# Patient Record
Sex: FEMALE | Race: BLACK OR AFRICAN AMERICAN | Employment: FULL TIME | URBAN - METROPOLITAN AREA
[De-identification: names, ages, dates, MRNs, and addresses within clinical notes are randomized per-mention and may not be internally consistent; named-entity substitution may affect disease eponyms.]

---

## 2017-04-06 ENCOUNTER — OFFICE VISIT (OUTPATIENT)
Dept: FAMILY MEDICINE CLINIC | Age: 27
End: 2017-04-06

## 2017-04-06 VITALS
HEART RATE: 115 BPM | SYSTOLIC BLOOD PRESSURE: 124 MMHG | RESPIRATION RATE: 16 BRPM | OXYGEN SATURATION: 98 % | HEIGHT: 64 IN | DIASTOLIC BLOOD PRESSURE: 80 MMHG | BODY MASS INDEX: 23.2 KG/M2 | WEIGHT: 135.9 LBS | TEMPERATURE: 100.2 F

## 2017-04-06 DIAGNOSIS — R01.1 MURMUR: ICD-10-CM

## 2017-04-06 DIAGNOSIS — J02.9 SORE THROAT: ICD-10-CM

## 2017-04-06 DIAGNOSIS — R50.9 FEVER, UNSPECIFIED FEVER CAUSE: Primary | ICD-10-CM

## 2017-04-06 DIAGNOSIS — T63.481A LOCAL REACTION TO INSECT STING, ACCIDENTAL OR UNINTENTIONAL, INITIAL ENCOUNTER: ICD-10-CM

## 2017-04-06 DIAGNOSIS — B34.9 VIRAL SYNDROME: ICD-10-CM

## 2017-04-06 LAB
QUICKVUE INFLUENZA TEST: NEGATIVE
S PYO AG THROAT QL: NEGATIVE
VALID INTERNAL CONTROL?: YES
VALID INTERNAL CONTROL?: YES

## 2017-04-06 RX ORDER — HYDROXYZINE PAMOATE 25 MG/1
25-50 CAPSULE ORAL
Qty: 30 CAP | Refills: 0 | Status: SHIPPED | OUTPATIENT
Start: 2017-04-06 | End: 2017-04-20

## 2017-04-06 RX ORDER — TRIAMCINOLONE ACETONIDE 5 MG/G
CREAM TOPICAL
Qty: 15 G | Refills: 5 | Status: SHIPPED | OUTPATIENT
Start: 2017-04-06

## 2017-04-06 RX ORDER — BISMUTH SUBSALICYLATE 262 MG
1 TABLET,CHEWABLE ORAL DAILY
COMMUNITY
End: 2017-10-09 | Stop reason: ALTCHOICE

## 2017-04-06 NOTE — PATIENT INSTRUCTIONS
TODAY, please go to:   CHECK OUT     Please schedule the following appointments at CHECK OUT:  · Dr. Goodwin Dear as needed    _____________________     XTUIL'K Plan:  · Use triamcinolone on areas of swelling  · You can also add hydroxyzine to help with itch. Do not take if driving or if you need to be alert because this medicine can make your sleepy. For viral infection symptoms:   Use OTC Tylenol (up to 650mg every 6 hours)  CAN ALTERNATE with Ibuprofen (up to 800 mg every 8 hours) as needed for pain, fever or headaches. · Use a nasal steroid (flonase, nasonex, nasocort, OTC)  · Increase fluid intake, especially water to thin mucous and boost the immune system. · Avoid sugar and dairy while congested since they thicken mucous. · Get plenty of rest!    · Gargle 3 times daily and as needed in Listerine or warm salt water vinegar solutions (1 tsp salt, 1 tsp vinegar in 1 cup lukewarm water.)    · Use OTC nasal saline spray up each nostril four times daily. You could also consider using a netipot with distilled water. · Use humidifier at bedtime. · Use OTC Mucinex 600 mg twice daily to loosen mucous. ·  Avoid decongestants and Ibuprofen if you have high blood pressure! Return to the doctor for evaluation:  · If mucous is consistently discolored yellow or green throughout the day for more than a week  · If you develop worsening facial pain  · If you develop a fever that will not go away  · If your symptoms worsen instead of improve        _____________________     Review your health maintenance below. Make plans to return and address anything that is due or will be due soon. There are no preventive care reminders to display for this patient.

## 2017-04-06 NOTE — PROGRESS NOTES
[address] Visit   Patient ID:   Idania Hill is a 32 y.o. female. Assessment/Plan:    Aaron Cook was seen today for sore throat, ear fullness, insect bite, headache, chills and ear pain. Diagnoses and all orders for this visit:      Viral syndrome  Sx likely viral etiology. No e/o bacterial infection on exam/ reviewed return precautions. Recommend tylenol and or ibuprofen for discomfort and fever. Reviewed how to alternate medications. See pt instructions for more. Fever, unspecified fever cause  flu and strep neg. Murmur today likely related to fever. Monitor on follow up   -     AMB POC RAPID STREP A  -     AMB POC RAPID INFLUENZA TEST    Sore throat  Strep negative  -     AMB POC RAPID STREP A    Local reaction to insect sting, accidental or unintentional, initial encounter  No cellulitis. Favor local allergic reaction. tx with topical steroid bid. Reviewed return precautions. -     hydrOXYzine pamoate (VISTARIL) 25 mg capsule; Take 1-2 Caps by mouth two (2) times daily as needed for Itching for up to 14 days. -     triamcinolone (ARISTOCORT) 0.5 % topical cream; Apply  to affected area two (2) times daily as needed for Other (bug bite with swelling). use thin layer      Counselled pt on:  Patient health concerns, plan as outlined in patient instructions and above. Patient was offered a choice/choices in the treatment plan today. Patient expresses understanding of the plan and agrees with recommendations. Patient Instructions   TODAY, please go to:   CHECK OUT     Please schedule the following appointments at CHECK OUT:  · Dr. Paloma Medina as needed    _____________________     RHKBT'X Plan:  · Use triamcinolone on areas of swelling  · You can also add hydroxyzine to help with itch. Do not take if driving or if you need to be alert because this medicine can make your sleepy.      For viral infection symptoms:   Use OTC Tylenol (up to 650mg every 6 hours)  CAN ALTERNATE with Ibuprofen (up to 800 mg every 8 hours) as needed for pain, fever or headaches. · Use a nasal steroid (flonase, nasonex, nasocort, OTC)  · Increase fluid intake, especially water to thin mucous and boost the immune system. · Avoid sugar and dairy while congested since they thicken mucous. · Get plenty of rest!    · Gargle 3 times daily and as needed in Listerine or warm salt water vinegar solutions (1 tsp salt, 1 tsp vinegar in 1 cup lukewarm water.)    · Use OTC nasal saline spray up each nostril four times daily. You could also consider using a netipot with distilled water. · Use humidifier at bedtime. · Use OTC Mucinex 600 mg twice daily to loosen mucous. ·  Avoid decongestants and Ibuprofen if you have high blood pressure! Return to the doctor for evaluation:  · If mucous is consistently discolored yellow or green throughout the day for more than a week  · If you develop worsening facial pain  · If you develop a fever that will not go away  · If your symptoms worsen instead of improve        _____________________     Review your health maintenance below. Make plans to return and address anything that is due or will be due soon. There are no preventive care reminders to display for this patient. Subjective:   HPI:  Aubrie Mosley is a 32 y.o. female being seen for:   Chief Complaint   Patient presents with    Sore Throat     before the bug bite    Ear Fullness     before the bug bite,     Insect Bite     bites on both thighs and right calf    Headache     before the bug bite    Chills    Ear Pain     Past medical history, surgical history, social history, family history, medications, allergies reviewed and updated.  See below for more detail  pcp Jerrel Cockayne, DO     ST  ·  headache since 4/3   · Other symptoms about 2 days  · Throat hurts into ears  · Dry cough  · Rhinorrhea  · Fever at home, Tx 100.3  · Feeling bad and had cold sx before going to meeting and having skin lesions    Swelling, bug bites  · Got possible insect bites on 4/4. Was at a meeting inside. Wore a dress that day. Cloth seats. 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 Objective:     Visit Vitals    /80 (BP 1 Location: Left arm, BP Patient Position: Sitting)    Pulse (!) 115    Temp 100.2 °F (37.9 °C) (Oral)    Resp 16    Ht 5' 4.02\" (1.626 m)    Wt 135 lb 14.4 oz (61.6 kg)    SpO2 98%    BMI 23.32 kg/m2     Wt Readings from Last 3 Encounters:   04/06/17 135 lb 14.4 oz (61.6 kg)   11/02/16 136 lb (61.7 kg)   01/19/14 131 lb 13.4 oz (59.8 kg)     PHQ 2 / 9, over the last two weeks 4/6/2017   Little interest or pleasure in doing things Not at all   Feeling down, depressed or hopeless Not at all   Total Score PHQ 2 0         Physical Exam   Constitutional: She appears well-developed and well-nourished. Non-toxic appearance. No distress. HENT:   Right Ear: Tympanic membrane and ear canal normal. Tympanic membrane is not erythematous and not bulging. Left Ear: Tympanic membrane and ear canal normal. Tympanic membrane is not erythematous and not bulging.    Nose: Mucosal edema and rhinorrhea present. Right sinus exhibits no maxillary sinus tenderness and no frontal sinus tenderness. Left sinus exhibits no maxillary sinus tenderness and no frontal sinus tenderness. Mouth/Throat: Posterior oropharyngeal erythema present. No oropharyngeal exudate, posterior oropharyngeal edema or tonsillar abscesses. Tonsils are 1+ on the right. Tonsils are 1+ on the left. No tonsillar exudate. ?serous fluid behind b/l TM   Cardiovascular: Normal rate and regular rhythm. Exam reveals no gallop and no friction rub. Murmur heard. Systolic murmur is present with a grade of 3/6   Pulmonary/Chest: Effort normal. No respiratory distress. She has no decreased breath sounds. She has no wheezes. She has no rhonchi. She has no rales. Neurological: She is alert. Skin:        Large areas with central erythema and mild induration and warmth that fades approaching the borders. No fluctiance or drainage. No punctum. Psychiatric: She has a normal mood and affect. Her behavior is normal.     Allergies   Allergen Reactions    Pcn [Penicillins] Hives    Venom-Honey Bee Hives     AND WASPS     Prior to Admission medications    Medication Sig Start Date End Date Taking? Authorizing Provider   multivitamin (ONE A DAY) tablet Take 1 Tab by mouth daily. Yes Historical Provider   hydrOXYzine pamoate (VISTARIL) 25 mg capsule Take 1-2 Caps by mouth two (2) times daily as needed for Itching for up to 14 days. 4/6/17 4/20/17 Yes Jose F Maki MD   triamcinolone (ARISTOCORT) 0.5 % topical cream Apply  to affected area two (2) times daily as needed for Other (bug bite with swelling). use thin layer 4/6/17  Yes Lui Mo. Celestine Maki MD   L-Norgest&E Estradiol-E Estrad (SEASONIQUE) 0.15 mg-30 mcg (84)/10 mcg (7) 3MPk Take 1 Tab by mouth daily.  Indications: PREGNANCY CONTRACEPTION 11/2/16  Yes Chrissy Riggs,    EPINEPHrine (EPIPEN) 0.3 mg/0.3 mL (1:1,000) injection 0.3 mL by IntraMUSCular route once as needed for Allergic Response (BEE STINGS AND WASPS) for 1 dose.  1/10/13  Yes Graham Sierra, DO

## 2017-04-06 NOTE — PROGRESS NOTES
Chief Complaint   Patient presents with    Sore Throat     before the bug bite    Ear Fullness     before the bug bite    Insect Bite     bites on both thighs and right calf    Headache     before the bug bite           1. Have you been to the ER, urgent care clinic since your last visit? Hospitalized since your last visit? no    2. Have you seen or consulted any other health care providers outside of the [address] [place] [address] since your last visit? Include any pap smears or colon screening.   NO

## 2017-04-06 NOTE — LETTER
NOTIFICATION RETURN TO WORK / SCHOOL 
 
4/6/2017 11:45 AM 
 
Ms. Trice Paredes [address] [facility] To Whom It May Concern: 
 
Trice Paredes is currently under the care of Lele Martínez. She will return to work/school on: 4/12/17 If there are questions or concerns please have the patient contact our office. Sincerely, 
 
 
Edgar Alvarado MD

## 2017-10-09 ENCOUNTER — OFFICE VISIT (OUTPATIENT)
Dept: FAMILY MEDICINE CLINIC | Age: 27
End: 2017-10-09

## 2017-10-09 VITALS
WEIGHT: 131.5 LBS | HEART RATE: 86 BPM | BODY MASS INDEX: 22.45 KG/M2 | SYSTOLIC BLOOD PRESSURE: 125 MMHG | DIASTOLIC BLOOD PRESSURE: 86 MMHG | RESPIRATION RATE: 16 BRPM | TEMPERATURE: 97.5 F | HEIGHT: 64 IN | OXYGEN SATURATION: 98 %

## 2017-10-09 DIAGNOSIS — N93.8 DUB (DYSFUNCTIONAL UTERINE BLEEDING): ICD-10-CM

## 2017-10-09 DIAGNOSIS — Z23 ENCOUNTER FOR IMMUNIZATION: ICD-10-CM

## 2017-10-09 DIAGNOSIS — R63.4 WEIGHT LOSS: ICD-10-CM

## 2017-10-09 DIAGNOSIS — Z80.3 FAMILY HISTORY OF BREAST CANCER: ICD-10-CM

## 2017-10-09 DIAGNOSIS — Z91.030 BEE STING ALLERGY: ICD-10-CM

## 2017-10-09 DIAGNOSIS — Z00.00 WELL WOMAN EXAM (NO GYNECOLOGICAL EXAM): Primary | ICD-10-CM

## 2017-10-09 DIAGNOSIS — Z82.49 FAMILY HISTORY OF HEART DISEASE: ICD-10-CM

## 2017-10-09 DIAGNOSIS — Z83.3 FAMILY HISTORY OF DIABETES MELLITUS (DM): ICD-10-CM

## 2017-10-09 DIAGNOSIS — R03.0 ELEVATED BLOOD PRESSURE READING WITHOUT DIAGNOSIS OF HYPERTENSION: ICD-10-CM

## 2017-10-09 DIAGNOSIS — E55.9 VITAMIN D DEFICIENCY: ICD-10-CM

## 2017-10-09 PROBLEM — T63.481A LOCAL REACTION TO INSECT STING: Status: RESOLVED | Noted: 2017-04-06 | Resolved: 2017-10-09

## 2017-10-09 LAB
HCG URINE, QL. (POC): NEGATIVE
VALID INTERNAL CONTROL?: YES

## 2017-10-09 RX ORDER — LEVONORGESTREL / ETHINYL ESTRADIOL AND ETHINYL ESTRADIOL 150-30(84)
1 KIT ORAL DAILY
Qty: 3 PACKAGE | Refills: 3 | Status: SHIPPED | OUTPATIENT
Start: 2017-10-09

## 2017-10-09 RX ORDER — EPINEPHRINE 0.3 MG/.3ML
0.3 INJECTION SUBCUTANEOUS
Qty: 0.3 ML | Refills: 1 | Status: SHIPPED | OUTPATIENT
Start: 2017-10-09

## 2017-10-09 NOTE — PROGRESS NOTES
Jo Ann Ruiz is a 32 y.o. female who presents for routine immunizations. She denies any symptoms , reactions or allergies that would exclude them from being immunized today. Risks and adverse reactions were discussed and the VIS was given to them. All questions were addressed. She was observed for 15 min post injection. There were no reactions observed.     Denisse Tamez

## 2017-10-09 NOTE — PATIENT INSTRUCTIONS
Well Visit, Ages 25 to 48: Care Instructions  Your Care Instructions  Physical exams can help you stay healthy. Your doctor has checked your overall health and may have suggested ways to take good care of yourself. He or she also may have recommended tests. At home, you can help prevent illness with healthy eating, regular exercise, and other steps. Follow-up care is a key part of your treatment and safety. Be sure to make and go to all appointments, and call your doctor if you are having problems. It's also a good idea to know your test results and keep a list of the medicines you take. How can you care for yourself at home? · Reach and stay at a healthy weight. This will lower your risk for many problems, such as obesity, diabetes, heart disease, and high blood pressure. · Get at least 30 minutes of physical activity on most days of the week. Walking is a good choice. You also may want to do other activities, such as running, swimming, cycling, or playing tennis or team sports. Discuss any changes in your exercise program with your doctor. · Do not smoke or allow others to smoke around you. If you need help quitting, talk to your doctor about stop-smoking programs and medicines. These can increase your chances of quitting for good. · Talk to your doctor about whether you have any risk factors for sexually transmitted infections (STIs). Having one sex partner (who does not have STIs and does not have sex with anyone else) is a good way to avoid these infections. · Use birth control if you do not want to have children at this time. Talk with your doctor about the choices available and what might be best for you. · Protect your skin from too much sun. When you're outdoors from 10 a.m. to 4 p.m., stay in the shade or cover up with clothing and a hat with a wide brim. Wear sunglasses that block UV rays. Even when it's cloudy, put broad-spectrum sunscreen (SPF 30 or higher) on any exposed skin.   · See a dentist one or two times a year for checkups and to have your teeth cleaned. · Wear a seat belt in the car. · Drink alcohol in moderation, if at all. That means no more than 2 drinks a day for men and 1 drink a day for women. Follow your doctor's advice about when to have certain tests. These tests can spot problems early. For everyone  · Cholesterol. Have the fat (cholesterol) in your blood tested after age 21. Your doctor will tell you how often to have this done based on your age, family history, or other things that can increase your risk for heart disease. · Blood pressure. Have your blood pressure checked during a routine doctor visit. Your doctor will tell you how often to check your blood pressure based on your age, your blood pressure results, and other factors. · Vision. Talk with your doctor about how often to have a glaucoma test.  · Diabetes. Ask your doctor whether you should have tests for diabetes. · Colon cancer. Have a test for colon cancer at age 48. You may have one of several tests. If you are younger than 48, you may need a test earlier if you have any risk factors. Risk factors include whether you already had a precancerous polyp removed from your colon or whether your parent, brother, sister, or child has had colon cancer. For women  · Breast exam and mammogram. Talk to your doctor about when you should have a clinical breast exam and a mammogram. Medical experts differ on whether and how often women under 50 should have these tests. Your doctor can help you decide what is right for you. · Pap test and pelvic exam. Begin Pap tests at age 24. A Pap test is the best way to find cervical cancer. The test often is part of a pelvic exam. Ask how often to have this test.  · Tests for sexually transmitted infections (STIs). Ask whether you should have tests for STIs. You may be at risk if you have sex with more than one person, especially if your partners do not wear condoms.   For men  · Tests for sexually transmitted infections (STIs). Ask whether you should have tests for STIs. You may be at risk if you have sex with more than one person, especially if you do not wear a condom. · Testicular cancer exam. Ask your doctor whether you should check your testicles regularly. · Prostate exam. Talk to your doctor about whether you should have a blood test (called a PSA test) for prostate cancer. Experts differ on whether and when men should have this test. Some experts suggest it if you are older than 39 and are -American or have a father or brother who got prostate cancer when he was younger than 72. When should you call for help? Watch closely for changes in your health, and be sure to contact your doctor if you have any problems or symptoms that concern you. Where can you learn more? Go to http://deisy-annabel.info/. Enter P072 in the search box to learn more about \"Well Visit, Ages 25 to 48: Care Instructions. \"  Current as of: July 19, 2016  Content Version: 11.3  © 5555-3361 [employer]. Care instructions adapted under license by [employer] (which disclaims liability or warranty for this information). If you have questions about a medical condition or this instruction, always ask your healthcare professional. [place] [address] any warranty or liability for your use of this information. Elevated Blood Pressure: Care Instructions  Your Care Instructions    Blood pressure is a measure of how hard the blood pushes against the walls of your arteries. It's normal for blood pressure to go up and down throughout the day. But if it stays up over time, you have high blood pressure. Two numbers tell you your blood pressure. The first number is the systolic pressure. It shows how hard the blood pushes when your heart is pumping. The second number is the diastolic pressure.  It shows how hard the blood pushes between heartbeats, when your heart is relaxed and filling with blood. An ideal blood pressure in adults is less than 120/80 (say \"120 over 80\"). High blood pressure is 140/90 or higher. You have high blood pressure if your top number is 140 or higher or your bottom number is 90 or higher, or both. The main test for high blood pressure is simple, fast, and painless. To diagnose high blood pressure, your doctor will test your blood pressure at different times. After testing your blood pressure, your doctor may ask you to test it again when you are home. If you are diagnosed with high blood pressure, you can work with your doctor to make a long-term plan to manage it. Follow-up care is a key part of your treatment and safety. Be sure to make and go to all appointments, and call your doctor if you are having problems. It's also a good idea to know your test results and keep a list of the medicines you take. How can you care for yourself at home? · Do not smoke. Smoking increases your risk for heart attack and stroke. If you need help quitting, talk to your doctor about stop-smoking programs and medicines. These can increase your chances of quitting for good. · Stay at a healthy weight. · Try to limit how much sodium you eat to less than 2,300 milligrams (mg) a day. Your doctor may ask you to try to eat less than 1,500 mg a day. · Be physically active. Get at least 30 minutes of exercise on most days of the week. Walking is a good choice. You also may want to do other activities, such as running, swimming, cycling, or playing tennis or team sports. · Avoid or limit alcohol. Talk to your doctor about whether you can drink any alcohol. · Eat plenty of fruits, vegetables, and low-fat dairy products. Eat less saturated and total fats. · Learn how to check your blood pressure at home. When should you call for help?   Call your doctor now or seek immediate medical care if:  · Your blood pressure is much higher than normal (such as 180/110 or higher). · You think high blood pressure is causing symptoms such as:  ¨ Severe headache. ¨ Blurry vision. Watch closely for changes in your health, and be sure to contact your doctor if:  · You do not get better as expected. Where can you learn more? Go to http://deisy-annabel.info/. Enter L845 in the search box to learn more about \"Elevated Blood Pressure: Care Instructions. \"  Current as of: April 3, 2017  Content Version: 11.3  © 9555-9851 [employer]. Care instructions adapted under license by [employer] (which disclaims liability or warranty for this information). If you have questions about a medical condition or this instruction, always ask your healthcare professional. [address] any warranty or liability for your use of this information. Check BP twice weekly. Notify me if it consistently is above 140/90 or below 90/60. Bring your blood pressure log to your next office visit. Decrease salt, fats, caffeine, alcohol in your diet. Increase water, fiber. Exercise 5 times weekly for 30 minutes daily as tolerated. Continue current medications, care and subspecialty follow up. Visit eye doctor yearly to check your eyes blood pressure related changes. Elevated Blood Pressure: Care Instructions  Your Care Instructions    Blood pressure is a measure of how hard the blood pushes against the walls of your arteries. It's normal for blood pressure to go up and down throughout the day. But if it stays up over time, you have high blood pressure. Two numbers tell you your blood pressure. The first number is the systolic pressure. It shows how hard the blood pushes when your heart is pumping. The second number is the diastolic pressure. It shows how hard the blood pushes between heartbeats, when your heart is relaxed and filling with blood.  An ideal blood pressure in adults is less than 120/80 (say \"120 over 80\"). High blood pressure is 140/90 or higher. You have high blood pressure if your top number is 140 or higher or your bottom number is 90 or higher, or both. The main test for high blood pressure is simple, fast, and painless. To diagnose high blood pressure, your doctor will test your blood pressure at different times. After testing your blood pressure, your doctor may ask you to test it again when you are home. If you are diagnosed with high blood pressure, you can work with your doctor to make a long-term plan to manage it. Follow-up care is a key part of your treatment and safety. Be sure to make and go to all appointments, and call your doctor if you are having problems. It's also a good idea to know your test results and keep a list of the medicines you take. How can you care for yourself at home? · Do not smoke. Smoking increases your risk for heart attack and stroke. If you need help quitting, talk to your doctor about stop-smoking programs and medicines. These can increase your chances of quitting for good. · Stay at a healthy weight. · Try to limit how much sodium you eat to less than 2,300 milligrams (mg) a day. Your doctor may ask you to try to eat less than 1,500 mg a day. · Be physically active. Get at least 30 minutes of exercise on most days of the week. Walking is a good choice. You also may want to do other activities, such as running, swimming, cycling, or playing tennis or team sports. · Avoid or limit alcohol. Talk to your doctor about whether you can drink any alcohol. · Eat plenty of fruits, vegetables, and low-fat dairy products. Eat less saturated and total fats. · Learn how to check your blood pressure at home. When should you call for help? Call your doctor now or seek immediate medical care if:  · Your blood pressure is much higher than normal (such as 180/110 or higher). · You think high blood pressure is causing symptoms such as:  ¨ Severe headache.   ¨ Blurry vision. Watch closely for changes in your health, and be sure to contact your doctor if:  · You do not get better as expected. Where can you learn more? Go to http://deisy-annabel.info/. Enter M114 in the search box to learn more about \"Elevated Blood Pressure: Care Instructions. \"  Current as of: April 3, 2017  Content Version: 11.3  © 3277-9228 [employer]. Care instructions adapted under license by [employer] (which disclaims liability or warranty for this information). If you have questions about a medical condition or this instruction, always ask your healthcare professional. [place] [address] any warranty or liability for your use of this information.

## 2017-10-09 NOTE — MR AVS SNAPSHOT
Visit Information Date & Time Provider Department Dept. Phone Encounter #  
 10/9/2017  2:00 PM Alyx Phillips DO [facility] [phone] [id number] Follow-up Instructions Return in about 1 year (around 10/9/2018) for cpe. Upcoming Health Maintenance Date Due  
 PAP AKA CERVICAL CYTOLOGY 7/22/2019 DTaP/Tdap/Td series (2 - Td) 5/1/2022 Allergies as of 10/9/2017  Review Complete On: 10/9/2017 By: Alyx Phillips DO Severity Noted Reaction Type Reactions Pcn [Penicillins] High 12/22/2012    Hives Venom-honey Bee High 01/10/2013    Hives AND WASPS Current Immunizations  Reviewed on 10/9/2017 Name Date HPV 12/22/2008, 8/11/2008, 6/9/2008 Influenza Vaccine 10/28/2012 Influenza Vaccine (Quad) PF 11/2/2016  4:12 PM  
 Meningococcal (MCV4) Vaccine 1/1/1994 Meningococcal (MCV4P) Vaccine 12/6/2013 Td 1/1/1994 Tdap 5/1/2012 Reviewed by Alyx Phillips DO on 10/9/2017 at  3:26 PM  
You Were Diagnosed With   
  
 Codes Comments Well woman exam (no gynecological exam)    -  Primary ICD-10-CM: Z00.00 ICD-9-CM: V70.0   
 DUB (dysfunctional uterine bleeding)     ICD-10-CM: N93.8 ICD-9-CM: 626.8 stable on BCP Elevated blood pressure reading without diagnosis of hypertension     ICD-10-CM: R03.0 ICD-9-CM: 796.2 improving with exercise Vitamin D deficiency     ICD-10-CM: E55.9 ICD-9-CM: 268.9 Bee sting allergy     ICD-10-CM: Z91.038 
ICD-9-CM: V15.06 Weight loss     ICD-10-CM: R63.4 ICD-9-CM: 783.21 Family history of heart disease     ICD-10-CM: Z82.49 
ICD-9-CM: V17.49 Family history of diabetes mellitus (DM)     ICD-10-CM: Z83.3 ICD-9-CM: V18.0 Family history of breast cancer     ICD-10-CM: Z80.3 ICD-9-CM: V16.3 Vitals BP Pulse Temp Resp Height(growth percentile) Weight(growth percentile)  125/86 (BP 1 Location: Left arm, BP Patient Position: Sitting) 86 97.5 °F (36.4 °C) (Oral) 16 5' 4.02\" (1.626 m) 131 lb 8 oz (59.6 kg) LMP SpO2 BMI OB Status Smoking Status 2017 98% 22.56 kg/m2 Medically Induced Passive Smoke Exposure - Never Smoker Vitals History BMI and BSA Data Body Mass Index Body Surface Area  
 22.56 kg/m 2 1.64 m 2 Preferred Pharmacy Pharmacy Name Phone [facility] [id number] [facility] - [place] [address], [address] [facility] [phone] Your Updated Medication List  
  
   
This list is accurate as of: 10/9/17  3:33 PM.  Always use your most recent med list.  
  
  
  
  
 EPINEPHrine 0.3 mg/0.3 mL injection Commonly known as:  EPIPEN  
0.3 mL by IntraMUSCular route once as needed for Allergic Response (BEE STINGS AND WASPS) for up to 1 dose. L-Norgest&E Estradiol-E Estrad 0.15 mg-30 mcg (84)/10 mcg (7) 3mpk Commonly known as:  Rhetta Yuniel Take 1 Tab by mouth daily. Indications: Pregnancy Contraception  
  
 triamcinolone 0.5 % topical cream  
Commonly known as:  ARISTOCORT Apply  to affected area two (2) times daily as needed for Other (bug bite with swelling). use thin layer Prescriptions Printed Refills EPINEPHrine (EPIPEN) 0.3 mg/0.3 mL injection 1 Si.3 mL by IntraMUSCular route once as needed for Allergic Response (BEE STINGS AND WASPS) for up to 1 dose. Class: Print Route: IntraMUSCular Prescriptions Sent to Pharmacy Refills L-Norgest&E Estradiol-E Estrad (SEASONIQUE) 0.15 mg-30 mcg (84)/10 mcg (7) 3MPk 3 Sig: Take 1 Tab by mouth daily. Indications: Pregnancy Contraception Class: Normal  
 Pharmacy: [address] Ph #: [phone] Route: Oral  
  
We Performed the Following AMB POC URINE PREGNANCY TEST, VISUAL COLOR COMPARISON [35051 CPT(R)] CBC W/O DIFF [85070 CPT(R)] LIPID PANEL [38242 CPT(R)] METABOLIC PANEL, COMPREHENSIVE [19466 CPT(R)] TSH 3RD GENERATION [37929 CPT(R)] VITAMIN D, 25 HYDROXY [id number] CPT(R)] Follow-up Instructions Return in about 1 year (around 10/9/2018) for cpe. Patient Instructions Well Visit, Ages 25 to 48: Care Instructions Your Care Instructions Physical exams can help you stay healthy. Your doctor has checked your overall health and may have suggested ways to take good care of yourself. He or she also may have recommended tests. At home, you can help prevent illness with healthy eating, regular exercise, and other steps. Follow-up care is a key part of your treatment and safety. Be sure to make and go to all appointments, and call your doctor if you are having problems. It's also a good idea to know your test results and keep a list of the medicines you take. How can you care for yourself at home? · Reach and stay at a healthy weight. This will lower your risk for many problems, such as obesity, diabetes, heart disease, and high blood pressure. · Get at least 30 minutes of physical activity on most days of the week. Walking is a good choice. You also may want to do other activities, such as running, swimming, cycling, or playing tennis or team sports. Discuss any changes in your exercise program with your doctor. · Do not smoke or allow others to smoke around you. If you need help quitting, talk to your doctor about stop-smoking programs and medicines. These can increase your chances of quitting for good. · Talk to your doctor about whether you have any risk factors for sexually transmitted infections (STIs). Having one sex partner (who does not have STIs and does not have sex with anyone else) is a good way to avoid these infections. · Use birth control if you do not want to have children at this time. Talk with your doctor about the choices available and what might be best for you. · Protect your skin from too much sun. When you're outdoors from 10 a.m. to 4 p.m., stay in the shade or cover up with clothing and a hat with a wide brim. Wear sunglasses that block UV rays. Even when it's cloudy, put broad-spectrum sunscreen (SPF 30 or higher) on any exposed skin. · See a dentist one or two times a year for checkups and to have your teeth cleaned. · Wear a seat belt in the car. · Drink alcohol in moderation, if at all. That means no more than 2 drinks a day for men and 1 drink a day for women. Follow your doctor's advice about when to have certain tests. These tests can spot problems early. For everyone · Cholesterol. Have the fat (cholesterol) in your blood tested after age 21. Your doctor will tell you how often to have this done based on your age, family history, or other things that can increase your risk for heart disease. · Blood pressure. Have your blood pressure checked during a routine doctor visit. Your doctor will tell you how often to check your blood pressure based on your age, your blood pressure results, and other factors. · Vision. Talk with your doctor about how often to have a glaucoma test. 
· Diabetes. Ask your doctor whether you should have tests for diabetes. · Colon cancer. Have a test for colon cancer at age 48. You may have one of several tests. If you are younger than 48, you may need a test earlier if you have any risk factors. Risk factors include whether you already had a precancerous polyp removed from your colon or whether your parent, brother, sister, or child has had colon cancer. For women · Breast exam and mammogram. Talk to your doctor about when you should have a clinical breast exam and a mammogram. Medical experts differ on whether and how often women under 50 should have these tests. Your doctor can help you decide what is right for you. · Pap test and pelvic exam. Begin Pap tests at age 24. A Pap test is the best way to find cervical cancer.  The test often is part of a pelvic exam. Ask how often to have this test. 
 · Tests for sexually transmitted infections (STIs). Ask whether you should have tests for STIs. You may be at risk if you have sex with more than one person, especially if your partners do not wear condoms. For men · Tests for sexually transmitted infections (STIs). Ask whether you should have tests for STIs. You may be at risk if you have sex with more than one person, especially if you do not wear a condom. · Testicular cancer exam. Ask your doctor whether you should check your testicles regularly. · Prostate exam. Talk to your doctor about whether you should have a blood test (called a PSA test) for prostate cancer. Experts differ on whether and when men should have this test. Some experts suggest it if you are older than 39 and are -American or have a father or brother who got prostate cancer when he was younger than 72. When should you call for help? Watch closely for changes in your health, and be sure to contact your doctor if you have any problems or symptoms that concern you. Where can you learn more? Go to http://deisy-annabel.info/. Enter P072 in the search box to learn more about \"Well Visit, Ages 25 to 48: Care Instructions. \" Current as of: July 19, 2016 Content Version: 11.3 © 2694-4317 [employer], [employer]. Care instructions adapted under license by backstitch (which disclaims liability or warranty for this information). If you have questions about a medical condition or this instruction, always ask your healthcare professional. [place] [address] any warranty or liability for your use of this information. Elevated Blood Pressure: Care Instructions Your Care Instructions Blood pressure is a measure of how hard the blood pushes against the walls of your arteries. It's normal for blood pressure to go up and down throughout the day. But if it stays up over time, you have high blood pressure. Two numbers tell you your blood pressure. The first number is the systolic pressure. It shows how hard the blood pushes when your heart is pumping. The second number is the diastolic pressure. It shows how hard the blood pushes between heartbeats, when your heart is relaxed and filling with blood. An ideal blood pressure in adults is less than 120/80 (say \"120 over 80\"). High blood pressure is 140/90 or higher. You have high blood pressure if your top number is 140 or higher or your bottom number is 90 or higher, or both. The main test for high blood pressure is simple, fast, and painless. To diagnose high blood pressure, your doctor will test your blood pressure at different times. After testing your blood pressure, your doctor may ask you to test it again when you are home. If you are diagnosed with high blood pressure, you can work with your doctor to make a long-term plan to manage it. Follow-up care is a key part of your treatment and safety. Be sure to make and go to all appointments, and call your doctor if you are having problems. It's also a good idea to know your test results and keep a list of the medicines you take. How can you care for yourself at home? · Do not smoke. Smoking increases your risk for heart attack and stroke. If you need help quitting, talk to your doctor about stop-smoking programs and medicines. These can increase your chances of quitting for good. · Stay at a healthy weight. · Try to limit how much sodium you eat to less than 2,300 milligrams (mg) a day. Your doctor may ask you to try to eat less than 1,500 mg a day. · Be physically active. Get at least 30 minutes of exercise on most days of the week. Walking is a good choice. You also may want to do other activities, such as running, swimming, cycling, or playing tennis or team sports. · Avoid or limit alcohol. Talk to your doctor about whether you can drink any alcohol. · Eat plenty of fruits, vegetables, and low-fat dairy products. Eat less saturated and total fats. · Learn how to check your blood pressure at home. When should you call for help? Call your doctor now or seek immediate medical care if: 
· Your blood pressure is much higher than normal (such as 180/110 or higher). · You think high blood pressure is causing symptoms such as: ¨ Severe headache. ¨ Blurry vision. Watch closely for changes in your health, and be sure to contact your doctor if: 
· You do not get better as expected. Where can you learn more? Go to http://deisy-annabel.info/. Enter P376 in the search box to learn more about \"Elevated Blood Pressure: Care Instructions. \" Current as of: April 3, 2017 Content Version: 11.3 © 0887-9191 [employer]XR. Care instructions adapted under license by LookIt (which disclaims liability or warranty for this information). If you have questions about a medical condition or this instruction, always ask your healthcare professional. [place] [address] any warranty or liability for your use of this information. Check BP twice weekly. Notify me if it consistently is above 140/90 or below 90/60. Bring your blood pressure log to your next office visit. Decrease salt, fats, caffeine, alcohol in your diet. Increase water, fiber. Exercise 5 times weekly for 30 minutes daily as tolerated. Continue current medications, care and subspecialty follow up. Visit eye doctor yearly to check your eyes blood pressure related changes. Elevated Blood Pressure: Care Instructions Your Care Instructions Blood pressure is a measure of how hard the blood pushes against the walls of your arteries. It's normal for blood pressure to go up and down throughout the day. But if it stays up over time, you have high blood pressure. Two numbers tell you your blood pressure.  The first number is the systolic pressure. It shows how hard the blood pushes when your heart is pumping. The second number is the diastolic pressure. It shows how hard the blood pushes between heartbeats, when your heart is relaxed and filling with blood. An ideal blood pressure in adults is less than 120/80 (say \"120 over 80\"). High blood pressure is 140/90 or higher. You have high blood pressure if your top number is 140 or higher or your bottom number is 90 or higher, or both. The main test for high blood pressure is simple, fast, and painless. To diagnose high blood pressure, your doctor will test your blood pressure at different times. After testing your blood pressure, your doctor may ask you to test it again when you are home. If you are diagnosed with high blood pressure, you can work with your doctor to make a long-term plan to manage it. Follow-up care is a key part of your treatment and safety. Be sure to make and go to all appointments, and call your doctor if you are having problems. It's also a good idea to know your test results and keep a list of the medicines you take. How can you care for yourself at home? · Do not smoke. Smoking increases your risk for heart attack and stroke. If you need help quitting, talk to your doctor about stop-smoking programs and medicines. These can increase your chances of quitting for good. · Stay at a healthy weight. · Try to limit how much sodium you eat to less than 2,300 milligrams (mg) a day. Your doctor may ask you to try to eat less than 1,500 mg a day. · Be physically active. Get at least 30 minutes of exercise on most days of the week. Walking is a good choice. You also may want to do other activities, such as running, swimming, cycling, or playing tennis or team sports. · Avoid or limit alcohol. Talk to your doctor about whether you can drink any alcohol. · Eat plenty of fruits, vegetables, and low-fat dairy products. Eat less saturated and total fats. · Learn how to check your blood pressure at home. When should you call for help? Call your doctor now or seek immediate medical care if: 
· Your blood pressure is much higher than normal (such as 180/110 or higher). · You think high blood pressure is causing symptoms such as: ¨ Severe headache. ¨ Blurry vision. Watch closely for changes in your health, and be sure to contact your doctor if: 
· You do not get better as expected. Where can you learn more? Go to http://deisy-annabel.info/. Enter U528 in the search box to learn more about \"Elevated Blood Pressure: Care Instructions. \" Current as of: April 3, 2017 Content Version: 11.3 © 8753-5259 [employer]. Care instructions adapted under license by GiftRocket (which disclaims liability or warranty for this information). If you have questions about a medical condition or this instruction, always ask your healthcare professional. [place] [address] any warranty or liability for your use of this information. Introducing [facility]! Dear Vasu Stockton: Thank you for requesting a Arcivr account. Our records indicate that you already have an active Arcivr account. You can access your account anytime at [email or web address]. [email or web address] Did you know that you can access your hospital and ER discharge instructions at any time in Arcivr? You can also review all of your test results from your hospital stay or ER visit. Additional Information If you have questions, please visit the Frequently Asked Questions section of the Arcivr website at [email or web address] Remember, Arcivr is NOT to be used for urgent needs. For medical emergencies, dial 911. Now available from your iPhone and Android! Please provide this summary of care documentation to your next provider. Your primary care clinician is listed as Gunnar Anna.  If you have any questions after today's visit, please call [phone].

## 2017-10-09 NOTE — PROGRESS NOTES
HISTORY OF PRESENT ILLNESS  Edis Niece is a 32 y.o. female here for an annual physical exam    Agree with nurse note. Pt with Vit D deficiency, hx of elevated BP, and family hx of breast cancer, heart disease, and DM presents to the office with a BP of 125/86. She weighs 131 lbs, lost 4 lbs since 04/2017. She started running and ran a 10k in 04/2017. She averages 10,000 steps daily and monitors via her FitBit. Pt with hx of DUB. She reports having pap smear in 2016 with Dr. Tony Rice. Requests refill of Seasonique, tolerating well. Menses have been stable. She had an eye exam in 01/2017 with [facility]. She has a hx of bee sting allergy and requests a new Epipen Rx. Written by lenard Faria, as dictated by Dr. Jan Cohn DO.    ROS    Review of Systems is negative except as mentioned above in HPI. ALLERGIES:    Allergies   Allergen Reactions    Pcn [Penicillins] Hives    Venom-Honey Bee Hives     AND WASPS       CURRENT MEDICATIONS:    Outpatient Prescriptions Marked as Taking for the 10/9/17 encounter (Office Visit) with Maxx Landry DO   Medication Sig Dispense Refill    L-Norgest&E Estradiol-E Estrad (SEASONIQUE) 0.15 mg-30 mcg (84)/10 mcg (7) 3MPk Take 1 Tab by mouth daily. Indications: Pregnancy Contraception 3 Package 3    EPINEPHrine (EPIPEN) 0.3 mg/0.3 mL injection 0.3 mL by IntraMUSCular route once as needed for Allergic Response (BEE STINGS AND WASPS) for up to 1 dose.  0.3 mL 1       PAST MEDICAL HISTORY:    Past Medical History:   Diagnosis Date    Asthma childhood    Chickenpox childhood    CTS (carpal tunnel syndrome)     DUB (dysfunctional uterine bleeding) 2008    Fibrocystic breast 12/2011       PAST SURGICAL HISTORY:    Past Surgical History:   Procedure Laterality Date    ME DEBRIDEMENT OPEN WOUND 20 SQ CM<  2015    [facility] [address]    Family History   Problem Relation Age of Onset    Heart Disease Mother     Hypertension Mother     Diabetes Mother     Heart Disease Father     Lung Disease Father     Breast Cancer Maternal Aunt        SOCIAL HISTORY:    Social History     Social History    Marital status: SINGLE     Spouse name: N/A    Number of children: N/A    Years of education: N/A     Social History Main Topics    Smoking status: Passive Smoke Exposure - Never Smoker    Smokeless tobacco: Never Used      Comment: lives with smoker mom x 19 years    Alcohol use No    Drug use: No    Sexual activity: Yes     Partners: Male     Birth control/ protection: Pill     Other Topics Concern    None     Social History Narrative       IMMUNIZATIONS:    Immunization History   Administered Date(s) Administered    HPV 06/09/2008, 08/11/2008, 12/22/2008    Influenza Vaccine 10/28/2012    Influenza Vaccine (Quad) PF 11/02/2016    Meningococcal (MCV4) Vaccine 01/01/1994    Meningococcal (MCV4P) Vaccine 12/06/2013    Td 01/01/1994    Tdap 05/01/2012       PHYSICAL EXAMINATION    Vital signs     Visit Vitals    /86 (BP 1 Location: Left arm, BP Patient Position: Sitting)    Pulse 86    Temp 97.5 °F (36.4 °C) (Oral)    Resp 16    Ht 5' 4.02\" (1.626 m)    Wt 131 lb 8 oz (59.6 kg)    LMP 08/28/2017    SpO2 98%    BMI 22.56 kg/m2        General appearance - Well nourished. Well appearing. Well developed. No acute distress. Head - Normocephalic. Atraumatic. Non tender sinuses x 4. Eyes - pupils equal and reactive, extraocular eye movements intact, sclera anicteric. Mildly injected sclera. Ears - Hearing is grossly normal bilaterally. Mild TM retraction BL. External ear canals normal without evidence of blood or swelling. Nose - normal and patent. No erythema or turbinate edema. No discharge. No polyps. Mouth - mucous membranes with adequate moisture. Posterior pharynx normal without lesions, white exudate, or obstruction. Neck - supple. Midline trachea.   No carotid bruits are noted. No thyromegaly noted. Neck is supple without rigidity. Chest - clear to auscultation bilaterally anterriorly and posteriorly. No wheezes, rales or rhonchi. Breath sounds are symmetrical bilaterally. Unlabored respirations. Heart - normal rate. Regular rhythm. Normal S1, S2. No murmurs. No rubs, clicks or gallops noted. Abdomen - soft and nondistended. No masses or organomegaly. No rebound, rigidity or guarding. Bowel sounds normal x 4 quadrants. No tenderness noted. Back exam - normal range of motion. No pain on palpation of the spinous processes in the cervical, thoracic, lumbar, sacral regions. No CVA tenderness. Neurological - awake, alert and oriented to person, place, and time and event. Cranial nerves II through XII intact. No focal findings. Clear speech. Muscle strength is +5/5 x 4 extremities. Sensation is intact to light touch bilaterally. Steady gait. Musculoskeletal - Intact x 4 extremities. Full ROM x 4 extremities. No pain with movement. No pain on palpation of the bilateral shoulders, elbows, wrists, hands. No tenderness in the pelvis, pubic bone, bilateral hips, knees, ankles. No obvious deformity  Heme/Lymph - peripheral pulses normal x 4 extremities. No peripheral edema is noted. No cervical adenopathy noted. Skin - no rashes, erythema, ecchymosis, lacerations, abrasions, suspicious moles  Psychological -   normal behavior, dress and thought processes. Good insight. Good eye contact. Normal affect. Appropriate mood. Normal speech.       DATA REVIEWED    Lab Results   Component Value Date/Time    Hemoglobin (POC) 13.6 01/19/2014 05:37 AM    Hematocrit (POC) 40 01/19/2014 05:37 AM     Lab Results   Component Value Date/Time    Sodium 141 06/06/2013 08:47 AM    Potassium 4.0 06/06/2013 08:47 AM    Chloride 105 06/06/2013 08:47 AM    CO2 24 06/06/2013 08:47 AM    Glucose 80 06/06/2013 08:47 AM    BUN 12 06/06/2013 08:47 AM    Creatinine 0.94 06/06/2013 08:47 AM    BUN/Creatinine ratio 13 06/06/2013 08:47 AM    GFR est non-AA 86 06/06/2013 08:47 AM    Calcium 9.1 06/06/2013 08:47 AM    Bilirubin, total 0.5 06/06/2013 08:47 AM    AST (SGOT) 20 06/06/2013 08:47 AM    Alk. phosphatase 43 06/06/2013 08:47 AM    Protein, total 6.3 06/06/2013 08:47 AM    Albumin 4.1 06/06/2013 08:47 AM    A-G Ratio 1.9 06/06/2013 08:47 AM    ALT (SGPT) 18 06/06/2013 08:47 AM     Lab Results   Component Value Date/Time    Cholesterol, total 124 06/06/2013 08:47 AM    Cholesterol, Total 135 06/06/2013 08:47 AM    HDL Cholesterol 78 06/06/2013 08:47 AM    LDL, calculated 42 06/06/2013 08:47 AM    VLDL, calculated 4 06/06/2013 08:47 AM    Triglyceride 20 06/06/2013 08:47 AM     Lab Results   Component Value Date/Time    VITAMIN D, 25-HYDROXY 24.2 06/06/2013 08:47 AM         ASSESSMENT and PLAN    ICD-10-CM ICD-9-CM    1. Well woman exam (no gynecological exam) Z00.00 V70.0 AMB POC URINE PREGNANCY TEST, VISUAL COLOR COMPARISON      LIPID PANEL      METABOLIC PANEL, COMPREHENSIVE      TSH 3RD GENERATION      VITAMIN D, 25 HYDROXY      CBC W/O DIFF   2. DUB (dysfunctional uterine bleeding) N93.8 626.8 L-Norgest&E Estradiol-E Estrad (SEASONIQUE) 0.15 mg-30 mcg (84)/10 mcg (7) 3MPk      METABOLIC PANEL, COMPREHENSIVE      TSH 3RD GENERATION      CBC W/O DIFF    stable on BCP   3. Elevated blood pressure reading without diagnosis of hypertension R03.0 796.2     improving with exercise   4. Vitamin D deficiency E55.9 268.9 VITAMIN D, 25 HYDROXY   5. Bee sting allergy Z91.038 V15.06 EPINEPHrine (EPIPEN) 0.3 mg/0.3 mL injection   6. Weight loss Z41.2 872.36 METABOLIC PANEL, COMPREHENSIVE      CBC W/O DIFF   7. Family history of heart disease Q94.50 T50.65 METABOLIC PANEL, COMPREHENSIVE   8. Family history of diabetes mellitus (DM) Z36.8 A44.8 METABOLIC PANEL, COMPREHENSIVE   9.  Family history of breast cancer Z80.3 V16.3      See ophthalmologist every 2 to 3 years unless otherwise directed. See dentist every 6 months. See dermatologist for annual check. Prescriptions written and sent to pharmacist or given to patient. Epipen 0.3 mg/0.3 mL. Seasonique 0.15 mg-30 mcg. Continue current medications. Chart reviewed and updated. Get ov notes from Dr. Jenna Cervantes and [facility]. Advised pt to sign medical release form at check out today. Discussed recent test results and goals with patient. Recheck pertinent labs today. Immunizations are noted. Administered flu vaccine today. Advised balanced diet and exercise. Proper rest.  Increase water and fiber. Avoid tobacco.  Decrease alcohol and caffeine. Decrease carbohydrates, increase green leafy vegetables and protein. Increase water intake. Eat 3-5 small meals daily. Increase physical activity. Relevant handouts provided and discussed with pt. Counselled pt on:  Patient health concerns. Advanced care planning. Advance Care Booklet given at office visit. Discussed with pt today. Declines honoring choices referral.   Discussed the patient's BMI with her. The BMI follow up plan is as follows: Pt not eligible for BMI calculation due to normal weight. Follow-up Disposition:  Return in about 1 year (around 10/9/2018) for cpe. Patient was offered a choice/choices in the treatment plan today. Patient expresses understanding of the plan and agrees with recommendations. Written by lenard Faria, as dictated by Dr. Jan Cohn DO. Documentation True and Accepted by Malika Merrill. Jinalan Check. Patient Instructions        Well Visit, Ages 25 to 48: Care Instructions  Your Care Instructions  Physical exams can help you stay healthy. Your doctor has checked your overall health and may have suggested ways to take good care of yourself. He or she also may have recommended tests. At home, you can help prevent illness with healthy eating, regular exercise, and other steps.   Follow-up care is a key part of your treatment and safety. Be sure to make and go to all appointments, and call your doctor if you are having problems. It's also a good idea to know your test results and keep a list of the medicines you take. How can you care for yourself at home? · Reach and stay at a healthy weight. This will lower your risk for many problems, such as obesity, diabetes, heart disease, and high blood pressure. · Get at least 30 minutes of physical activity on most days of the week. Walking is a good choice. You also may want to do other activities, such as running, swimming, cycling, or playing tennis or team sports. Discuss any changes in your exercise program with your doctor. · Do not smoke or allow others to smoke around you. If you need help quitting, talk to your doctor about stop-smoking programs and medicines. These can increase your chances of quitting for good. · Talk to your doctor about whether you have any risk factors for sexually transmitted infections (STIs). Having one sex partner (who does not have STIs and does not have sex with anyone else) is a good way to avoid these infections. · Use birth control if you do not want to have children at this time. Talk with your doctor about the choices available and what might be best for you. · Protect your skin from too much sun. When you're outdoors from 10 a.m. to 4 p.m., stay in the shade or cover up with clothing and a hat with a wide brim. Wear sunglasses that block UV rays. Even when it's cloudy, put broad-spectrum sunscreen (SPF 30 or higher) on any exposed skin. · See a dentist one or two times a year for checkups and to have your teeth cleaned. · Wear a seat belt in the car. · Drink alcohol in moderation, if at all. That means no more than 2 drinks a day for men and 1 drink a day for women. Follow your doctor's advice about when to have certain tests. These tests can spot problems early. For everyone  · Cholesterol.  Have the fat (cholesterol) in your blood tested after age 21. Your doctor will tell you how often to have this done based on your age, family history, or other things that can increase your risk for heart disease. · Blood pressure. Have your blood pressure checked during a routine doctor visit. Your doctor will tell you how often to check your blood pressure based on your age, your blood pressure results, and other factors. · Vision. Talk with your doctor about how often to have a glaucoma test.  · Diabetes. Ask your doctor whether you should have tests for diabetes. · Colon cancer. Have a test for colon cancer at age 48. You may have one of several tests. If you are younger than 48, you may need a test earlier if you have any risk factors. Risk factors include whether you already had a precancerous polyp removed from your colon or whether your parent, brother, sister, or child has had colon cancer. For women  · Breast exam and mammogram. Talk to your doctor about when you should have a clinical breast exam and a mammogram. Medical experts differ on whether and how often women under 50 should have these tests. Your doctor can help you decide what is right for you. · Pap test and pelvic exam. Begin Pap tests at age 24. A Pap test is the best way to find cervical cancer. The test often is part of a pelvic exam. Ask how often to have this test.  · Tests for sexually transmitted infections (STIs). Ask whether you should have tests for STIs. You may be at risk if you have sex with more than one person, especially if your partners do not wear condoms. For men  · Tests for sexually transmitted infections (STIs). Ask whether you should have tests for STIs. You may be at risk if you have sex with more than one person, especially if you do not wear a condom. · Testicular cancer exam. Ask your doctor whether you should check your testicles regularly.   · Prostate exam. Talk to your doctor about whether you should have a blood test (called a PSA test) for prostate cancer. Experts differ on whether and when men should have this test. Some experts suggest it if you are older than 39 and are -American or have a father or brother who got prostate cancer when he was younger than 72. When should you call for help? Watch closely for changes in your health, and be sure to contact your doctor if you have any problems or symptoms that concern you. Where can you learn more? Go to http://deisy-annabel.info/. Enter P072 in the search box to learn more about \"Well Visit, Ages 25 to 48: Care Instructions. \"  Current as of: July 19, 2016  Content Version: 11.3  © 4598-8446 [employer]. Care instructions adapted under license by [employer] ([employer]) (which disclaims liability or warranty for this information). If you have questions about a medical condition or this instruction, always ask your healthcare professional. [place] [address] any warranty or liability for your use of this information. Elevated Blood Pressure: Care Instructions  Your Care Instructions    Blood pressure is a measure of how hard the blood pushes against the walls of your arteries. It's normal for blood pressure to go up and down throughout the day. But if it stays up over time, you have high blood pressure. Two numbers tell you your blood pressure. The first number is the systolic pressure. It shows how hard the blood pushes when your heart is pumping. The second number is the diastolic pressure. It shows how hard the blood pushes between heartbeats, when your heart is relaxed and filling with blood. An ideal blood pressure in adults is less than 120/80 (say \"120 over 80\"). High blood pressure is 140/90 or higher. You have high blood pressure if your top number is 140 or higher or your bottom number is 90 or higher, or both. The main test for high blood pressure is simple, fast, and painless.  To diagnose high blood pressure, your doctor will test your blood pressure at different times. After testing your blood pressure, your doctor may ask you to test it again when you are home. If you are diagnosed with high blood pressure, you can work with your doctor to make a long-term plan to manage it. Follow-up care is a key part of your treatment and safety. Be sure to make and go to all appointments, and call your doctor if you are having problems. It's also a good idea to know your test results and keep a list of the medicines you take. How can you care for yourself at home? · Do not smoke. Smoking increases your risk for heart attack and stroke. If you need help quitting, talk to your doctor about stop-smoking programs and medicines. These can increase your chances of quitting for good. · Stay at a healthy weight. · Try to limit how much sodium you eat to less than 2,300 milligrams (mg) a day. Your doctor may ask you to try to eat less than 1,500 mg a day. · Be physically active. Get at least 30 minutes of exercise on most days of the week. Walking is a good choice. You also may want to do other activities, such as running, swimming, cycling, or playing tennis or team sports. · Avoid or limit alcohol. Talk to your doctor about whether you can drink any alcohol. · Eat plenty of fruits, vegetables, and low-fat dairy products. Eat less saturated and total fats. · Learn how to check your blood pressure at home. When should you call for help? Call your doctor now or seek immediate medical care if:  · Your blood pressure is much higher than normal (such as 180/110 or higher). · You think high blood pressure is causing symptoms such as:  ¨ Severe headache. ¨ Blurry vision. Watch closely for changes in your health, and be sure to contact your doctor if:  · You do not get better as expected. Where can you learn more? Go to http://deisy-annabel.info/.   Enter Y841 in the search box to learn more about \"Elevated Blood Pressure: Care Instructions. \"  Current as of: April 3, 2017  Content Version: 11.3  © 7803-2737 [employer]. Care instructions adapted under license by [employer] (which disclaims liability or warranty for this information). If you have questions about a medical condition or this instruction, always ask your healthcare professional. [address] any warranty or liability for your use of this information. Check BP twice weekly. Notify me if it consistently is above 140/90 or below 90/60. Bring your blood pressure log to your next office visit. Decrease salt, fats, caffeine, alcohol in your diet. Increase water, fiber. Exercise 5 times weekly for 30 minutes daily as tolerated. Continue current medications, care and subspecialty follow up. Visit eye doctor yearly to check your eyes blood pressure related changes. Elevated Blood Pressure: Care Instructions  Your Care Instructions    Blood pressure is a measure of how hard the blood pushes against the walls of your arteries. It's normal for blood pressure to go up and down throughout the day. But if it stays up over time, you have high blood pressure. Two numbers tell you your blood pressure. The first number is the systolic pressure. It shows how hard the blood pushes when your heart is pumping. The second number is the diastolic pressure. It shows how hard the blood pushes between heartbeats, when your heart is relaxed and filling with blood. An ideal blood pressure in adults is less than 120/80 (say \"120 over 80\"). High blood pressure is 140/90 or higher. You have high blood pressure if your top number is 140 or higher or your bottom number is 90 or higher, or both. The main test for high blood pressure is simple, fast, and painless. To diagnose high blood pressure, your doctor will test your blood pressure at different times.  After testing your blood pressure, your doctor may ask you to test it again when you are home. If you are diagnosed with high blood pressure, you can work with your doctor to make a long-term plan to manage it. Follow-up care is a key part of your treatment and safety. Be sure to make and go to all appointments, and call your doctor if you are having problems. It's also a good idea to know your test results and keep a list of the medicines you take. How can you care for yourself at home? · Do not smoke. Smoking increases your risk for heart attack and stroke. If you need help quitting, talk to your doctor about stop-smoking programs and medicines. These can increase your chances of quitting for good. · Stay at a healthy weight. · Try to limit how much sodium you eat to less than 2,300 milligrams (mg) a day. Your doctor may ask you to try to eat less than 1,500 mg a day. · Be physically active. Get at least 30 minutes of exercise on most days of the week. Walking is a good choice. You also may want to do other activities, such as running, swimming, cycling, or playing tennis or team sports. · Avoid or limit alcohol. Talk to your doctor about whether you can drink any alcohol. · Eat plenty of fruits, vegetables, and low-fat dairy products. Eat less saturated and total fats. · Learn how to check your blood pressure at home. When should you call for help? Call your doctor now or seek immediate medical care if:  · Your blood pressure is much higher than normal (such as 180/110 or higher). · You think high blood pressure is causing symptoms such as:  ¨ Severe headache. ¨ Blurry vision. Watch closely for changes in your health, and be sure to contact your doctor if:  · You do not get better as expected. Where can you learn more? Go to http://deisy-annabel.info/. Enter N083 in the search box to learn more about \"Elevated Blood Pressure: Care Instructions. \"  Current as of: April 3, 2017  Content Version: 11.3  © 7266-8489 [employer].  Care instructions adapted under license by [employer] (which disclaims liability or warranty for this information). If you have questions about a medical condition or this instruction, always ask your healthcare professional. [address] any warranty or liability for your use of this information.

## 2017-10-09 NOTE — ACP (ADVANCE CARE PLANNING)
Discussed ACP with patient. Gave pt an Right to [facility]. Patient prefers to read it on her own. Declines referral to [facility] team at this time. Patient will bring document to her next office visit or attach it to her MyChart record.

## 2017-10-09 NOTE — PROGRESS NOTES
1. Have you been to the ER, urgent care clinic since your last visit? Hospitalized since your last visit? No    2. Have you seen or consulted any other health care providers outside of the [address] [place] [address] since your last visit? Include any pap smears or colon screening. No     Chief Complaint   Patient presents with    Complete Physical     Patient believes she had a pap smear done last year with her old PCP Dr. Derrick Vazquez with [facility].      Not fasting    Health Maintenance Due   Topic Date Due    INFLUENZA AGE 9 TO ADULT   -Patient would like a flu shot today 08/01/2017

## 2017-10-10 LAB
25(OH)D3+25(OH)D2 SERPL-MCNC: 27.7 NG/ML (ref 30–100)
ALBUMIN SERPL-MCNC: 4.4 G/DL (ref 3.5–5.5)
ALBUMIN/GLOB SERPL: 1.7 {RATIO} (ref 1.2–2.2)
ALP SERPL-CCNC: 41 IU/L (ref 39–117)
ALT SERPL-CCNC: 23 IU/L (ref 0–32)
AST SERPL-CCNC: 25 IU/L (ref 0–40)
BILIRUB SERPL-MCNC: 0.3 MG/DL (ref 0–1.2)
BUN SERPL-MCNC: 12 MG/DL (ref 6–20)
BUN/CREAT SERPL: 13 (ref 9–23)
CALCIUM SERPL-MCNC: 9.2 MG/DL (ref 8.7–10.2)
CHLORIDE SERPL-SCNC: 101 MMOL/L (ref 96–106)
CO2 SERPL-SCNC: 24 MMOL/L (ref 18–29)
CREAT SERPL-MCNC: 0.95 MG/DL (ref 0.57–1)
ERYTHROCYTE [DISTWIDTH] IN BLOOD BY AUTOMATED COUNT: 12.9 % (ref 12.3–15.4)
GLOBULIN SER CALC-MCNC: 2.6 G/DL (ref 1.5–4.5)
GLUCOSE SERPL-MCNC: 80 MG/DL (ref 65–99)
HCT VFR BLD AUTO: 40.7 % (ref 34–46.6)
HGB BLD-MCNC: 13.9 G/DL (ref 11.1–15.9)
MCH RBC QN AUTO: 31.9 PG (ref 26.6–33)
MCHC RBC AUTO-ENTMCNC: 34.2 G/DL (ref 31.5–35.7)
MCV RBC AUTO: 93 FL (ref 79–97)
PLATELET # BLD AUTO: 269 X10E3/UL (ref 150–379)
POTASSIUM SERPL-SCNC: 4.6 MMOL/L (ref 3.5–5.2)
PROT SERPL-MCNC: 7 G/DL (ref 6–8.5)
RBC # BLD AUTO: 4.36 X10E6/UL (ref 3.77–5.28)
SODIUM SERPL-SCNC: 138 MMOL/L (ref 134–144)
TSH SERPL DL<=0.005 MIU/L-ACNC: 1.75 UIU/ML (ref 0.45–4.5)
WBC # BLD AUTO: 5.5 X10E3/UL (ref 3.4–10.8)

## 2017-10-24 ENCOUNTER — TELEPHONE (OUTPATIENT)
Dept: FAMILY MEDICINE CLINIC | Age: 27
End: 2017-10-24

## 2017-10-24 DIAGNOSIS — E55.9 VITAMIN D DEFICIENCY: Primary | ICD-10-CM

## 2017-10-24 NOTE — TELEPHONE ENCOUNTER
Patient reviewed her lab results on line and is a little concerned about her vitamin d. She is requesting a return call to get all of her lab results. Her contact # is [phone].

## 2017-10-26 RX ORDER — ERGOCALCIFEROL 1.25 MG/1
50000 CAPSULE ORAL
Qty: 12 CAP | Refills: 0 | Status: SHIPPED | OUTPATIENT
Start: 2017-10-26 | End: 2018-03-29

## 2017-10-26 NOTE — TELEPHONE ENCOUNTER
All your lab results are normal except  Your Vitamin D is low. Start prescription vit D 50,000 international units weekly for 3 months then change to over the counter vit D 1000 international units daily. Recheck levels in 3-6 mos. NURSE WILL SEND a prescription to the pharmacy.

## 2017-10-26 NOTE — TELEPHONE ENCOUNTER
Pt called writer back. Writer spoke with pt. Writer made pt aware of Vitamin D being low, and that Dr. Mere Travis wanted pt to take Vitamin D 50,000 units 1 cap by mouth once a week for 3 months then switch to PTC Vitamin D 1,000 units once a day. Pt also notified that nurse sent medication into pharmacy on file. Pt verbalized understanding.

## 2017-10-26 NOTE — TELEPHONE ENCOUNTER
Writer called pt to notify of lab results and recommendations by Dr. Mejia Hodges. No answer. Writer left  requesting a phone call back. Writer sent over Vitamin D 50,000units 1 tab PO once a week for 3 months to pharmacy on file per V.O. Received from Dr. Mejia Hodges.

## 2017-11-06 DIAGNOSIS — Z30.02 COUNSELING AND INSTRUCTION IN NATURAL FAMILY PLANNING TO AVOID PREGNANCY: ICD-10-CM

## 2017-11-10 RX ORDER — LEVONORGESTREL / ETHINYL ESTRADIOL AND ETHINYL ESTRADIOL 150-30(84)
KIT ORAL
Refills: 3 | OUTPATIENT
Start: 2017-11-10

## 2017-11-10 NOTE — TELEPHONE ENCOUNTER
Writer called pharmacy after reviewing chart. Pt was given 3 rf's at last ov. Writer spoke with Kemar Felder from pharmacy regarding birth control [facility]) refill. Kemar Felder stated it was a pharmacy error and she will cancel refill request since they have the new rx with 3 refills on file.

## 2018-03-29 ENCOUNTER — OFFICE VISIT (OUTPATIENT)
Dept: URGENT CARE | Age: 28
End: 2018-03-29

## 2018-03-29 VITALS
DIASTOLIC BLOOD PRESSURE: 95 MMHG | WEIGHT: 133.8 LBS | BODY MASS INDEX: 23.71 KG/M2 | SYSTOLIC BLOOD PRESSURE: 133 MMHG | TEMPERATURE: 97 F | OXYGEN SATURATION: 99 % | HEIGHT: 63 IN | RESPIRATION RATE: 18 BRPM | HEART RATE: 112 BPM

## 2018-03-29 DIAGNOSIS — J32.9 SINUSITIS, UNSPECIFIED CHRONICITY, UNSPECIFIED LOCATION: Primary | ICD-10-CM

## 2018-03-29 RX ORDER — AZITHROMYCIN 250 MG/1
TABLET, FILM COATED ORAL
Qty: 6 TAB | Refills: 0 | Status: SHIPPED | OUTPATIENT
Start: 2018-03-29

## 2018-03-29 NOTE — PATIENT INSTRUCTIONS
Sinusitis: Care Instructions  Your Care Instructions    Sinusitis is an infection of the lining of the sinus cavities in your head. Sinusitis often follows a cold. It causes pain and pressure in your head and face. In most cases, sinusitis gets better on its own in 1 to 2 weeks. But some mild symptoms may last for several weeks. Sometimes antibiotics are needed. Follow-up care is a key part of your treatment and safety. Be sure to make and go to all appointments, and call your doctor if you are having problems. It's also a good idea to know your test results and keep a list of the medicines you take. How can you care for yourself at home? · Take an over-the-counter pain medicine, such as acetaminophen (Tylenol), ibuprofen (Advil, Motrin), or naproxen (Aleve). Read and follow all instructions on the label. · If the doctor prescribed antibiotics, take them as directed. Do not stop taking them just because you feel better. You need to take the full course of antibiotics. · Be careful when taking over-the-counter cold or flu medicines and Tylenol at the same time. Many of these medicines have acetaminophen, which is Tylenol. Read the labels to make sure that you are not taking more than the recommended dose. Too much acetaminophen (Tylenol) can be harmful. · Breathe warm, moist air from a steamy shower, a hot bath, or a sink filled with hot water. Avoid cold, dry air. Using a humidifier in your home may help. Follow the directions for cleaning the machine. · Use saline (saltwater) nasal washes to help keep your nasal passages open and wash out mucus and bacteria. You can buy saline nose drops at a grocery store or drugstore. Or you can make your own at home by adding 1 teaspoon of salt and 1 teaspoon of baking soda to 2 cups of distilled water. If you make your own, fill a bulb syringe with the solution, insert the tip into your nostril, and squeeze gently. Ray Luster your nose.   · Put a hot, wet towel or a warm gel pack on your face 3 or 4 times a day for 5 to 10 minutes each time. · Try a decongestant nasal spray like oxymetazoline (Afrin). Do not use it for more than 3 days in a row. Using it for more than 3 days can make your congestion worse. When should you call for help? Call your doctor now or seek immediate medical care if:  ? · You have new or worse swelling or redness in your face or around your eyes. ? · You have a new or higher fever. ? Watch closely for changes in your health, and be sure to contact your doctor if:  ? · You have new or worse facial pain. ? · The mucus from your nose becomes thicker (like pus) or has new blood in it. ? · You are not getting better as expected. Where can you learn more? Go to http://deisy-annabel.info/. Enter A576 in the search box to learn more about \"Sinusitis: Care Instructions. \"  Current as of: May 12, 2017  Content Version: 11.4  © 9519-3365 Healthwise, Incorporated. Care instructions adapted under license by [employer] (which disclaims liability or warranty for this information). If you have questions about a medical condition or this instruction, always ask your healthcare professional. [address] any warranty or liability for your use of this information.

## 2018-03-29 NOTE — MR AVS SNAPSHOT
[address] [place] [address] 
[phone] Patient: Anabella Stubbs MRN: [id number] HOV:[id number] Visit Information Date & Time Provider Department Dept. Phone Encounter #  
 3/29/2018  2:30 PM [address] [facility] [phone] [id number] Upcoming Health Maintenance Date Due  
 PAP AKA CERVICAL CYTOLOGY 7/22/2019 DTaP/Tdap/Td series (2 - Td) 5/1/2022 Allergies as of 3/29/2018  Review Complete On: 3/29/2018 By: Debra Nickerson MD  
  
 Severity Noted Reaction Type Reactions Pcn [Penicillins] High 12/22/2012    Hives Venom-honey Bee High 01/10/2013    Hives AND WASPS Current Immunizations  Reviewed on 10/9/2017 Name Date HPV 12/22/2008, 8/11/2008, 6/9/2008 Influenza Vaccine 10/28/2012 Influenza Vaccine (Quad) PF 10/9/2017, 11/2/2016  4:12 PM  
 Meningococcal (MCV4) Vaccine 1/1/1994 Meningococcal (MCV4P) Vaccine 12/6/2013 Td 1/1/1994 Tdap 5/1/2012 Not reviewed this visit You Were Diagnosed With   
  
 Codes Comments Sinusitis, unspecified chronicity, unspecified location    -  Primary ICD-10-CM: J32.9 ICD-9-CM: 473.9 Vitals BP Pulse Temp Resp Height(growth percentile) Weight(growth percentile) (!) 133/95 (!) 112 97 °F (36.1 °C) 18 5' 3\" (1.6 m) 133 lb 12.8 oz (60.7 kg) SpO2 BMI OB Status Smoking Status 99% 23.7 kg/m2 Medically Induced Passive Smoke Exposure - Never Smoker BMI and BSA Data Body Mass Index Body Surface Area  
 23.7 kg/m 2 1.64 m 2 Preferred Pharmacy Pharmacy Name Phone [phone] [phone] Your Updated Medication List  
  
   
This list is accurate as of 3/29/18  2:50 PM.  Always use your most recent med list.  
  
  
  
  
 azithromycin 250 mg tablet Commonly known as:  Michell Patel Take two tablets today then one tablet daily EPINEPHrine 0.3 mg/0.3 mL injection Commonly known as:  EPIPEN  
0.3 mL by IntraMUSCular route once as needed for Allergic Response (BEE STINGS AND WASPS) for up to 1 dose. L-Norgest&E Estradiol-E Estrad 0.15 mg-30 mcg (84)/10 mcg (7) 3mpk Commonly known as:  Lilia Poke Take 1 Tab by mouth daily. Indications: Pregnancy Contraception  
  
 triamcinolone 0.5 % topical cream  
Commonly known as:  ARISTOCORT Apply  to affected area two (2) times daily as needed for Other (bug bite with swelling). use thin layer Prescriptions Sent to Pharmacy Refills  
 azithromycin (ZITHROMAX) 250 mg tablet 0 Sig: Take two tablets today then one tablet daily Class: Normal  
 Pharmacy: [facility] [address] [address] [place] [address] #: [phone] Patient Instructions Sinusitis: Care Instructions Your Care Instructions Sinusitis is an infection of the lining of the sinus cavities in your head. Sinusitis often follows a cold. It causes pain and pressure in your head and face. In most cases, sinusitis gets better on its own in 1 to 2 weeks. But some mild symptoms may last for several weeks. Sometimes antibiotics are needed. Follow-up care is a key part of your treatment and safety. Be sure to make and go to all appointments, and call your doctor if you are having problems. It's also a good idea to know your test results and keep a list of the medicines you take. How can you care for yourself at home? · Take an over-the-counter pain medicine, such as acetaminophen (Tylenol), ibuprofen (Advil, Motrin), or naproxen (Aleve). Read and follow all instructions on the label. · If the doctor prescribed antibiotics, take them as directed. Do not stop taking them just because you feel better. You need to take the full course of antibiotics. · Be careful when taking over-the-counter cold or flu medicines and Tylenol at the same time. Many of these medicines have acetaminophen, which is Tylenol. Read the labels to make sure that you are not taking more than the recommended dose. Too much acetaminophen (Tylenol) can be harmful. · Breathe warm, moist air from a steamy shower, a hot bath, or a sink filled with hot water. Avoid cold, dry air. Using a humidifier in your home may help. Follow the directions for cleaning the machine. · Use saline (saltwater) nasal washes to help keep your nasal passages open and wash out mucus and bacteria. You can buy saline nose drops at a grocery store or drugstore. Or you can make your own at home by adding 1 teaspoon of salt and 1 teaspoon of baking soda to 2 cups of distilled water. If you make your own, fill a bulb syringe with the solution, insert the tip into your nostril, and squeeze gently. [place] your nose. · Put a hot, wet towel or a warm gel pack on your face 3 or 4 times a day for 5 to 10 minutes each time. · Try a decongestant nasal spray like oxymetazoline (Afrin). Do not use it for more than 3 days in a row. Using it for more than 3 days can make your congestion worse. When should you call for help? Call your doctor now or seek immediate medical care if: 
? · You have new or worse swelling or redness in your face or around your eyes. ? · You have a new or higher fever. ? Watch closely for changes in your health, and be sure to contact your doctor if: 
? · You have new or worse facial pain. ? · The mucus from your nose becomes thicker (like pus) or has new blood in it. ? · You are not getting better as expected. Where can you learn more? Go to http://deisy-annabel.info/. Enter D861 in the search box to learn more about \"Sinusitis: Care Instructions. \" Current as of: May 12, 2017 Content Version: 11.4 © 8737-4690 Healthwise, [employer].  Care instructions adapted under license by [address] (which disclaims liability or warranty for this information). If you have questions about a medical condition or this instruction, always ask your healthcare professional. [address] any warranty or liability for your use of this information. Introducing [facility]! Dear Mendoza Ny: Thank you for requesting a [employer] account. Our records indicate that you already have an active [employer] account. You can access your account anytime at [email or web address]. [email or web address] | [employer] Did you know that you can access your hospital and ER discharge instructions at any time in [employer]? You can also review all of your test results from your hospital stay or ER visit. Additional Information If you have questions, please visit the Frequently Asked Questions section of the [employer] website at [email or web address] Remember, [employer] is NOT to be used for urgent needs. For medical emergencies, dial 911. Now available from your iPhone and Android! Please provide this summary of care documentation to your next provider. Your primary care clinician is listed as Chin Velasco. If you have any questions after today's visit, please call [phone].

## 2018-03-29 NOTE — PROGRESS NOTES
Patient is a 32 y.o. female presenting with sinus pain. The history is provided by the patient. Sinus Pain   This is a new problem. Episode onset: 4 days ago - sinus pain worse with moving head along with congestion, runny nose, sinus pressure, bilateral ear pain. The problem occurs constantly. The problem has not changed since onset. Pertinent negatives include no chest pain, no headaches and no shortness of breath. Treatments tried: advil. The treatment provided no relief. Past Medical History:   Diagnosis Date    Asthma childhood    Chickenpox childhood    CTS (carpal tunnel syndrome)     DUB (dysfunctional uterine bleeding) 2008    Fibrocystic breast 12/2011        Past Surgical History:   Procedure Laterality Date    WA DEBRIDEMENT OPEN WOUND 20 SQ CM<  2015    went to [facility] at [address] History   Problem Relation Age of Onset    Heart Disease Mother     Hypertension Mother     Diabetes Mother     Heart Disease Father     Lung Disease Father     Breast Cancer Maternal Aunt         Social History     Social History    Marital status: SINGLE     Spouse name: N/A    Number of children: N/A    Years of education: N/A     Occupational History    Not on file. Social History Main Topics    Smoking status: Passive Smoke Exposure - Never Smoker    Smokeless tobacco: Never Used      Comment: lives with smoker mom x 19 years    Alcohol use No    Drug use: No    Sexual activity: Yes     Partners: Male     Birth control/ protection: Pill     Other Topics Concern    Not on file     Social History Narrative                ALLERGIES: Pcn [penicillins] and Venom-honey bee    Review of Systems   Constitutional: Negative for activity change, appetite change, chills and fever. HENT: Positive for congestion, ear pain, rhinorrhea, sinus pain and sinus pressure. Negative for sore throat and trouble swallowing. Respiratory: Negative for cough, shortness of breath and wheezing. Cardiovascular: Negative for chest pain and palpitations. Gastrointestinal: Negative for nausea and vomiting. Musculoskeletal: Negative for myalgias. Neurological: Negative for dizziness and headaches. Hematological: Negative for adenopathy. Vitals:    03/29/18 1439   BP: (!) 133/95   Pulse: (!) 112   Resp: 18   Temp: 97 °F (36.1 °C)   SpO2: 99%   Weight: 133 lb 12.8 oz (60.7 kg)   Height: 5' 3\" (1.6 m)       Physical Exam   Constitutional: She appears well-developed and well-nourished. No distress. HENT:   Right Ear: Tympanic membrane, external ear and ear canal normal.   Left Ear: Tympanic membrane, external ear and ear canal normal.   Nose: Rhinorrhea present. Right sinus exhibits maxillary sinus tenderness and frontal sinus tenderness. Left sinus exhibits maxillary sinus tenderness and frontal sinus tenderness. Mouth/Throat: Mucous membranes are normal. Posterior oropharyngeal erythema present. No oropharyngeal exudate, posterior oropharyngeal edema or tonsillar abscesses. Cardiovascular: Normal rate, regular rhythm and normal heart sounds. Pulmonary/Chest: Effort normal and breath sounds normal. No respiratory distress. She has no wheezes. She has no rales. Lymphadenopathy:     She has no cervical adenopathy. Neurological: She is alert. Skin: She is not diaphoretic. Psychiatric: She has a normal mood and affect. Her behavior is normal. Judgment and thought content normal.   Nursing note and vitals reviewed. MDM    Procedures        ICD-10-CM ICD-9-CM    1. Sinusitis, unspecified chronicity, unspecified location J32.9 473.9      Medications Ordered Today   Medications    azithromycin (ZITHROMAX) 250 mg tablet     Sig: Take two tablets today then one tablet daily     Dispense:  6 Tab     Refill:  0     OTC flonase, mucinex    The patients condition was discussed with the patient and they understand.   The patient is to follow up with primary care doctor ,If signs and symptoms become worse the pt is to go to the ER. The patient is to take medications as prescribed.

## 2022-10-24 ENCOUNTER — APPOINTMENT (RX ONLY)
Dept: URBAN - METROPOLITAN AREA CLINIC 42 | Facility: CLINIC | Age: 32
Setting detail: DERMATOLOGY
End: 2022-10-24

## 2022-10-24 DIAGNOSIS — L81.0 POSTINFLAMMATORY HYPERPIGMENTATION: ICD-10-CM | Status: INADEQUATELY CONTROLLED

## 2022-10-24 PROCEDURE — ? COUNSELING

## 2022-10-24 PROCEDURE — 99202 OFFICE O/P NEW SF 15 MIN: CPT

## 2022-10-24 PROCEDURE — ? DIAGNOSIS COMMENT

## 2022-10-24 ASSESSMENT — LOCATION ZONE DERM: LOCATION ZONE: TRUNK

## 2022-10-24 ASSESSMENT — LOCATION SIMPLE DESCRIPTION DERM: LOCATION SIMPLE: LEFT UPPER BACK

## 2022-10-24 ASSESSMENT — LOCATION DETAILED DESCRIPTION DERM: LOCATION DETAILED: LEFT SUPERIOR UPPER BACK

## 2022-10-24 NOTE — HPI: DISCOLORATION
How Severe Is Your Skin Discoloration?: mild
Additional History: Pt reports allergy to bees and wasps, irritation to mosquitos—Interaction causes hyperpigmentation. Pt states recent rxn/hyperpigmentation to wax.

## 2022-10-24 NOTE — PROCEDURE: DIAGNOSIS COMMENT
Render Risk Assessment In Note?: yes
Detail Level: Simple
Comment: Pt reports irritation and PIH from previous Brazilian wax, pt believes that allergy is from wax—advised pt to perform at-home open allergy testing with different wax types.\\n\\nFor discoloration, advised pt to use OTC toner, SA and/or 10% glycolic acid moisturizer (the Ordinary, Naturium, Amlactin, Igor Kumar)—pt currently using Vaseline products. Discussed r/b/se of topical steroids on the groin area, all pt questions answered. Pt understands that process may take 6mo+